# Patient Record
Sex: MALE | Race: WHITE | URBAN - NONMETROPOLITAN AREA
[De-identification: names, ages, dates, MRNs, and addresses within clinical notes are randomized per-mention and may not be internally consistent; named-entity substitution may affect disease eponyms.]

---

## 2021-06-23 NOTE — PROGRESS NOTES
[facility] [address] [facility] initial consultation note    Kelsy Barbour                                                Chief complaint: Kelsy Barbour is a 48 y. o.oldmale came for further evaluation regarding his sleep apnea  with referral from Dr. Brooklynn Quick.    [address]:    Sleep/Wake schedule:  Usual time to go to bed during the work/regular day of week: 11:00 PM.  Usual time to wake up during the work//regular day of week: 6:00 AM.   He is currently working at [employer]. He works in second shift. He usually go to work at 3 PM and works until 11:00PM.    Over the weekends his sleep schedule: [x] Remain same. He usually falls a sleep in less than: 5minutes. He takes naps: No    Sleep Hygiene:    Is the temperature and evironment in his bed room is acceptable to him: Yes. He watches Television in his bed room: No.  He read books, study, pay bills etc in the bed: No.  Frequency He wake up during night/sleep: 6 to 7  Majority of nocturnal awakenings are for urination: Yes. Difficulty in falling back to sleep after nocturnal awakenings: No  .  Do you drink coffee: Yes. Do you drink caffeinated beverages i.e sodas: Yes. 3 to 4 can/s per day. Do you drink tea:Yes. 1 cup/s/glasse/s per day. Do you drink alcoholic beverages: No.  History of recreational drug use: No.     History of tobacco smoking:Yes. Amount of tobacco smokin.5 PPD. Years of tobacco smokin.                                    Quit smoking: Yes. Quit year: He quit smoking 6 months back. Sleep apnea symptoms:  Noticed to have loud snoring:Yes. Noted by his family member- spouse  Witnessed apneas during sleep noticed: Yes. Noted by his family member- spouse  History of choking and gasping sensation at night time: Yes. History of headaches in the morning:No.  History of dry mouth in the morning: Yes.   History of palpitations during night time/nocturnal awakenings: No.  History of sweating during night time/nocturnal awakenings: No    General:  History of head injury in the past: No.   History of seizures: No.   Rest less legs syndrome symptoms:NO   History suggestive of periodic limb movements during sleep: NO  History suggestive of hypnagogic hallucinations: NO  History suggestive of hypnopompic hallucinations: NO  History suggestive of sleep talking:NO  History suggestive of sleep walking:NO  History suggestive of bruxism: No.   History suggestive of cataplexy: NO  History suggestive of sleep paralysis: NO    Family history of sleep disorders:  Family history of obstructive sleep apnea: NO.  Family history of Narcolepsy: NO.  Family history of Rest less legs syndrome : NO.    History regarding old sleep studies:  Prior history of sleep study: Yes. Using CPAP device: No.  Currently using home Oxygen: NO.       Patient considerations:  Is the patient is ambulatory: Yes  Patient is currently using: None of these Wheelchair, [employer] or [address] Para/Quadriplegic: NO  Hearing deficit : NO  Claustrophobic: NO  MDD : NO  Blind: NO  Incontinent: NO  Para/Quadraplegi: NO.   Need transportation to and from Sleep Center:NO      Social History:  Social History     Tobacco Use    Smoking status: Former Smoker     Packs/day: 0.50     Years: 30.00     Pack years: 15.00     Types: Cigarettes     Quit date: 2021     Years since quittin.5    Smokeless tobacco: Current User     Types: Snuff   Substance Use Topics    Alcohol use: Not on file     Comment: social    Drug use: Never   . He is currently working: Yes. He is currently working in [employer]. Please see HPI for details of work scheduled.     Past Medical History:   Diagnosis Date    Hyperlipemia     Hypertension     Sleep apnea        Past Surgical History:   Procedure Laterality Date    NASAL SEPTUM SURGERY      TONSILLECTOMY      UVULECTOMY         No Known Allergies    No current outpatient medications on file. No current facility-administered medications for this visit. No family history on file. Review of Systems:   General/Constitutional: He gained approximately 20 pounds of weight from his old sleep study performed on 21 August 2017 with normal appetite. No fever or chills. HENT: Negative. Eyes: Negative. Upper respiratory tract: Occasional nasal stuffiness with no post nasal drip. Lower respiratory tract/ lungs: No cough or sputum production. No hemoptysis. Cardiovascular: No palpitations or chest pain. Gastrointestinal: No nausea or vomiting. Neurological: No focal neurologiacal weakness. Extremities: No edema. Musculoskeletal: No complaints. Genitourinary: No complaints. Hematological: Negative. Psychiatric/Behavioral: Negative. Skin: No itching. BP (!) 138/98 (Site: Left Lower Arm, Position: Sitting, Cuff Size: Medium Adult)   Pulse 103   Temp 97.5 °F (36.4 °C)   Ht 5' 10\" (1.778 m)   Wt 255 lb (115.7 kg)   SpO2 98% Comment: room air at rest  BMI 36.59 kg/m²   Mallampati airway Class:2  Neck Circumference:19.75. Inches  [place] sleepiness score 7/22/21:   Sleep apnea quality of life questionniare: 45    Physical Exam   Nursing note and vitals reviewed. Constitutional: Patient appears well built and well nourished. No distress. Patient is oriented to person, place, and time. HENT:   Head: Normocephalic and atraumatic. Right Ear: External ear normal.   Left Ear: External ear normal.   Mouth/Throat: Oropharynx is clear and moist.  No oral thrush. Eyes: Conjunctivae are normal. Pupils are equal, round, and reactive to light. No scleral icterus. Neck: Neck supple. No JVD present. No tracheal deviation present. Cardiovascular: Normal rate, regular rhythm, normal heart sounds. No murmur heard. Pulmonary/Chest: Effort normal and breath sounds normal. No stridor. No respiratory distress. No wheezes. No rales. Patient exhibits no tenderness. Abdominal: Soft. Patient exhibits no distension. No tenderness. Musculoskeletal: Normal range of motion. Extremities: Patient exhibits no edema and no tenderness. Lymphadenopathy:  No cervical adenopathy. Neurological: Patient is alert and oriented to person, place, and time. Skin: Skin is warm and dry. Patient is not diaphoretic. Psychiatric: Patient  has a normal mood and affect. Patient behavior is normal.     Diagnostic Data:      Home Sleep Study: 21 August 2017            Assessment:  -Severe obstructive sleep apnea diagnosed by a portable sleep study performed on 21 August 2017 by apnea link device at [facility] sleep lab by Dr. Rosa Maria Coronado. Amado Ratliff MD. He was given a CPAP device as a treatment for his severe obstructive sleep apnea. He used his CPAP device for only 1 week. Patient quit using his CPAP device due to intolerance and nasal stuffiness. He gave back his CPAP device to his [employer]. He underwent nasal surgery I.e deviated nasal septum correction along with a uvulopalatopharyngoplasty surgery in March 2021 by Dr. Evan Powell MD ENT surgeon at [facility].  -Inadequate sleep hygiene.  -Hypersomnia ( Excessive daytime sleepiness) may be due to controlled obstructive sleep apnea after having a nasal and uvulopalatopharyngoplasty surgery vs Inadequate sleep hygiene. He gives a history of having sleepy attacks while driving to his motor vehicles. -S/p Uvulopalatopharyngoplasty surgery in March, 2021. Recommendations/Plan:  - Schedule patient for nocturnal polysomnogram (Sleep study) with split night protocol at [facility]  sleep lab to diagnose sleep apnea and to get optimal pressure I.e CPAP or BiPAP to start/continue PAP therapy.  Patient to follow with my clinic at [facility] sleep clinic in 6 to 8 weeks with CPAP download for further evaluation.  -I had a discussion with patient regarding avialable treatment options for his sleep disorder breathing including but not limited to CPAP titration in the sleep lab Vs.Dental appliance placement with referral to a local dentist Vs other available surgical options including maxillomandibular ostomy and tracheostomy as last option. At the end of discussion, he decided on CPAP/BiPAP/AutoSV as a treatment if he found to have obstructive sleep apnea during above test/study.  -Patient educated about shift work sleep disorder and mechanism. He was advised to maintain fixed sleep schedule. -He was educated to practice good sleep hygiene practices. He was provided with a good sleep hygiene hand out.  -Tuyet ANTON was advised to make earlier appointment with my clinic if he develops any worsening of sleep symptoms. He verbalizes understanding.  Rossi ANTON was advised to not to drive any motor vehicles or operate heavy equipment until his sleep symptoms are under good control. Swati Kerr verbalizes understanding.  -He was advised to loose weight by controlling diet and doing exercise.  -He was advised to keep scheduled follow up appointment with Dr. Hank Hurtado- ENT surgeon.  - Keyon Pham was educated about my impression and plan. He verbalizes understanding.      -I personally reviewed updated the Past medical hx, Past surgical hx,Social hx, Family hx, Medications, Allergies in the discrete data section of the patient chart along with labs, Pulmonary medicine,Sleep medicine related, Pathological, Microbiological and Radiological investigations.

## 2021-07-22 ENCOUNTER — INITIAL CONSULT (OUTPATIENT)
Dept: PULMONOLOGY | Age: 46
End: 2021-07-22
Payer: COMMERCIAL

## 2021-07-22 VITALS
HEART RATE: 103 BPM | TEMPERATURE: 97.5 F | WEIGHT: 255 LBS | DIASTOLIC BLOOD PRESSURE: 98 MMHG | BODY MASS INDEX: 36.51 KG/M2 | SYSTOLIC BLOOD PRESSURE: 138 MMHG | OXYGEN SATURATION: 98 % | HEIGHT: 70 IN

## 2021-07-22 DIAGNOSIS — G47.33 OSA (OBSTRUCTIVE SLEEP APNEA): ICD-10-CM

## 2021-07-22 DIAGNOSIS — G47.30 SLEEP APNEA, UNSPECIFIED TYPE: ICD-10-CM

## 2021-07-22 DIAGNOSIS — G47.10 HYPERSOMNIA: Primary | ICD-10-CM

## 2021-07-22 PROCEDURE — 99204 OFFICE O/P NEW MOD 45 MIN: CPT | Performed by: INTERNAL MEDICINE

## 2021-07-22 NOTE — PATIENT INSTRUCTIONS
Recommendations/Plan:  - Schedule patient for nocturnal polysomnogram (Sleep study) with split night protocol at [facility]  sleep lab to diagnose sleep apnea and to get optimal pressure I.e CPAP or BiPAP to start/continue PAP therapy. Patient to follow with my clinic at [facility] sleep clinic in 6 to 8 weeks with CPAP download for further evaluation.  -I had a discussion with patient regarding avialable treatment options for his sleep disorder breathing including but not limited to CPAP titration in the sleep lab Vs.Dental appliance placement with referral to a local dentist Vs other available surgical options including maxillomandibular ostomy and tracheostomy as last option. At the end of discussion, he decided on CPAP/BiPAP/AutoSV as a treatment if he found to have obstructive sleep apnea during above test/study.  -Patient educated about shift work sleep disorder and mechanism. He was advised to maintain fixed sleep schedule. -He was educated to practice good sleep hygiene practices. He was provided with a good sleep hygiene hand out.  -Lisa ANTON was advised to make earlier appointment with my clinic if he develops any worsening of sleep symptoms. He verbalizes understanding.  Nnamdi ANTON was advised to not to drive any motor vehicles or operate heavy equipment until his sleep symptoms are under good control. Murali Kerr verbalizes understanding.  -He was advised to loose weight by controlling diet and doing exercise.  -He was advised to keep scheduled follow up appointment with Dr. Jessica Alexander- ENT surgeon.  - Bianca Hayes was educated about my impression and plan. He verbalizes understanding.

## 2021-07-22 NOTE — PROGRESS NOTES
Chief Complaint: New sleep consult referred by Dr Abhi Vaughan    Mallampati airway Class:2  Neck Circumference:19.75.  Inches    [place] sleepiness score 7/22/21:   Sleep apnea quality of life questionniare:

## 2021-08-02 DIAGNOSIS — G47.30 SLEEP APNEA, UNSPECIFIED TYPE: ICD-10-CM

## 2021-08-02 DIAGNOSIS — G47.10 HYPERSOMNIA: Primary | ICD-10-CM

## 2021-09-23 DIAGNOSIS — G47.30 SLEEP APNEA, UNSPECIFIED TYPE: ICD-10-CM

## 2021-09-23 DIAGNOSIS — G47.10 HYPERSOMNIA: ICD-10-CM

## 2021-10-20 ENCOUNTER — TELEPHONE (OUTPATIENT)
Dept: PULMONOLOGY | Age: 46
End: 2021-10-20

## 2021-10-20 NOTE — TELEPHONE ENCOUNTER
Patient hit the reschedule button through the automated system 360. I called and left a message to callback to get rescheduled.